# Patient Record
Sex: FEMALE | Race: WHITE
[De-identification: names, ages, dates, MRNs, and addresses within clinical notes are randomized per-mention and may not be internally consistent; named-entity substitution may affect disease eponyms.]

---

## 2019-01-02 ENCOUNTER — HOSPITAL ENCOUNTER (OUTPATIENT)
Dept: HOSPITAL 84 - D.MRI | Age: 69
Discharge: HOME | End: 2019-01-02
Attending: ORTHOPAEDIC SURGERY
Payer: MEDICARE
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
## 2019-01-02 DIAGNOSIS — S49.92XA: Primary | ICD-10-CM

## 2019-06-05 LAB
ERYTHROCYTE [DISTWIDTH] IN BLOOD BY AUTOMATED COUNT: 11.7 % (ref 11.5–14.5)
HCT VFR BLD CALC: 39.7 % (ref 36–48)
HGB BLD-MCNC: 13.8 G/DL (ref 12–16)
MCH RBC QN AUTO: 31.2 PG (ref 26–34)
MCHC RBC AUTO-ENTMCNC: 34.8 G/DL (ref 31–37)
MCV RBC: 89.8 FL (ref 80–100)
PLATELET # BLD: 298 10X3/UL (ref 130–400)
PMV BLD AUTO: 9.1 FL (ref 7.4–10.4)
RBC # BLD AUTO: 4.42 10X6/UL (ref 4–5.4)
WBC # BLD AUTO: 9 10X3/UL (ref 4.8–10.8)

## 2019-06-06 ENCOUNTER — HOSPITAL ENCOUNTER (OUTPATIENT)
Dept: HOSPITAL 84 - D.OPS | Age: 69
Discharge: HOME | End: 2019-06-06
Attending: ORTHOPAEDIC SURGERY
Payer: COMMERCIAL

## 2019-06-06 VITALS
WEIGHT: 168 LBS | BODY MASS INDEX: 28.68 KG/M2 | HEIGHT: 64 IN | BODY MASS INDEX: 28.68 KG/M2 | HEIGHT: 64 IN | WEIGHT: 168 LBS

## 2019-06-06 VITALS — DIASTOLIC BLOOD PRESSURE: 83 MMHG | SYSTOLIC BLOOD PRESSURE: 149 MMHG

## 2019-06-06 DIAGNOSIS — Z01.812: ICD-10-CM

## 2019-06-06 DIAGNOSIS — M75.42: Primary | ICD-10-CM

## 2019-06-06 DIAGNOSIS — M75.122: ICD-10-CM

## 2019-06-06 NOTE — NUR
1500 IV REMOVED WITH [facility]ATH[facility] INTACT.  ALL DISCHARGE INSTRUCTIONS GIVEN.
VOICES UNDERSTANDING.  DRESSED AT BEDSIDE.  TAKEN OUT VIA W/C TO CAR WITH
FAMILY. ADVISED TO CALL [facility] COME BACK IF ANY PROBLEMS.

## 2019-06-13 NOTE — OP
PATIENT NAME:  DONNY KERR                     MEDICAL RECORD: [id number]
:52                                             LOCATION:D.OPS          
                                                         ADMISSION DATE:        
SURGEON:  KARLOS RIZO MD        
 
 
DATE OF OPERATION:  2019
 
PREOPERATIVE DIAGNOSIS:  Rotator cuff tear of the left shoulder with impingement
syndrome.
 
POSTOPERATIVE DIAGNOSIS:  Rotator cuff tear of the left shoulder with
impingement syndrome.
 
PROCEDURES:
1.  Arthroscopic rotator cuff repair of the left shoulder.
2.  Arthroscopic distal clavicle excision done through separate incision -- 1
cm.
3.  Arthroscopic subacromial decompression with acromioplasty and bursectomy.
 
SURGEON:  Karlos Rizo MD
 
ANESTHESIA:  General.
 
INTRAOPERATIVE COMPLICATIONS:  None.
 
SUMMARY OF PATHOLOGIC FINDINGS:  Consistent with the preoperative diagnosis, the
patient had full thickness rotator cuff tearing along with excoriation of the
coracoacromial ligament with a downward sloping acromion as well as
acromioclavicular arthritis.
 
OPERATIVE SUMMARY IN DETAIL:  After obtaining the appropriate preoperative
orthopedic surgery consent as well as anesthetic consultation, evaluation, and
clearance and the appropriate time out, having been done and agreed upon by all,
the patient was placed in a right lateral decubitus position.  All pressure
points were well padded.  She was held firmly to the operating table using the
vacuum pack suction system.  Left upper extremity and shoulder were then prepped
and draped in routine sterile fashion.  The arm was held in the Arthrex traction
boom at 30 degrees of forward flexion, 30 degrees of abduction with 10 pounds of
traction laterally.  Arthroscopy was established in the glenohumeral joint from
the posterior portal.  Anterior portal was established in the anterior safe
interval.  Diagnostic arthroscopy showed full thickness tearing.  A
transarthroscopic rotator portal was created for debridement of the articular
aspect of the rotator cuff fibers that were needed further decortication of the
articular side of the supraspinatus tendinous footprint was carried out with the
arthroscopic resector.  Attention was then turned to subacromial space.  While
in the subacromial space, Adkins tissue ablation system was utilized to denude
the undersurface of the acromion of all soft tissue elements.  A 5-0 barrel bur
was used for acromioplasty at the level of acromioclavicular joint and then the
acromioclavicular joint was visualized and then through a separate anterior
arthroscopic portal, the distal clavicle was taken down for 1 cm including the
hypertrophic bone spurs inferiorly, superiorly.  Having completed this,
attention was turned to the rotator cuff and rotator cuff was then treated with
a single inverted #2 FiberTape which was anchored laterally with a #5 SwiveLock
from Arthrex resulted in excellent anatomic restoration of the rotator cuff back
over the supraspinatus tendinous footprint.  Having completed this, arthroscopy
portals were closed in routine interrupted fashion using 4-0 Prolene.  Sterile
dressings were applied.  The patient was awakened and taken to the recovery room
 
 
 
OPERATIVE REPORT                               [id number]    USHA,DONNY VALENTIN   
 
 
in stable condition.  All final needle and sponge counts were correct.
 
TRANSINT:[id number] Voice Confirmation ID: [id number] DOCUMENT ID: [id number]
                                           
                                           SALLIE NICOLE, KARLOS ROSS        
 
 
 
Electronically Signed by KARLOS RIZO MD on 19 at 1134
 
 
 
 
 
 
 
 
 
 
 
 
 
 
 
 
 
 
 
 
 
 
 
 
 
 
 
 
 
 
 
 
 
 
 
 
 
 
CC:                                                             [id number]
DICTATION DATE: 19 0952     :     19 1237      [facility] 
                                                                      [id number] [place] [address]